# Patient Record
(demographics unavailable — no encounter records)

---

## 2025-06-13 NOTE — REASON FOR VISIT
[Hyperlipidemia] : hyperlipidemia [Hypertension] : hypertension [FreeTextEntry1] : I saw this 39-year-old man in cardiac consultation on  06/13/25 He has a very strong family history of coronary artery disease, both parents had bypass surgery and he has a brother of 47 who had bypass surgery. He is a cigarette smoker and has had untreated hyperlipidemia with triglycerides greater than 390.

## 2025-06-13 NOTE — HISTORY OF PRESENT ILLNESS
[FreeTextEntry1] : he has no chest pain He has no shortness of breath He has no palpitations He has no syncope He is neurologically intact He has no edema He has no GI symptoms

## 2025-06-13 NOTE — DISCUSSION/SUMMARY
[FreeTextEntry1] : 1) I increased his Crestor to 10 mg daily and added Zetia 10 mg daily and he will get repeat blood work in 3 months. 2) I scheduled a coronary CTA to evaluate his arteries given his very strong family history for heart disease and the fact that he continues to smoke cigarettes. 3) he will remain on his blood pressure medication. 4) his EKG today is normal. 5) he will repeat the lipid profile in 3 months and see me in follow-up. 6) when I get the CT results I will discuss it with him [EKG obtained to assist in diagnosis and management of assessed problem(s)] : EKG obtained to assist in diagnosis and management of assessed problem(s)